# Patient Record
Sex: MALE | Race: OTHER | Employment: STUDENT | URBAN - METROPOLITAN AREA
[De-identification: names, ages, dates, MRNs, and addresses within clinical notes are randomized per-mention and may not be internally consistent; named-entity substitution may affect disease eponyms.]

---

## 2022-10-18 ENCOUNTER — OFFICE VISIT (OUTPATIENT)
Dept: OCCUPATIONAL MEDICINE | Age: 22
End: 2022-10-18

## 2022-10-18 DIAGNOSIS — Z13.1 SCREENING FOR DIABETES MELLITUS: ICD-10-CM

## 2022-10-18 DIAGNOSIS — Z13.220 SCREENING FOR LIPOID DISORDERS: ICD-10-CM

## 2022-10-18 DIAGNOSIS — Z00.00 ROUTINE GENERAL MEDICAL EXAMINATION AT A HEALTH CARE FACILITY: Primary | ICD-10-CM

## 2022-10-18 PROBLEM — U07.1 COVID-19 VIRUS DETECTED: Status: ACTIVE | Noted: 2020-07-03

## 2022-10-18 RX ORDER — ASCORBIC ACID 250 MG
TABLET ORAL
COMMUNITY

## 2022-10-18 NOTE — PROGRESS NOTES
PROGRESS NOTE    SUBJECTIVE:   Oliver Cr is a 23 y.o. male seen for labs today. Pt is seeing a nutritionist and wants to check his CMP, Lipids, Hgb A1C. Pt has no other complaints at this time. Current Outpatient Medications   Medication Sig Dispense Refill    ascorbic acid (VITAMIN C) 250 MG tablet Take by mouth       No current facility-administered medications for this visit. No Known Allergies  Social History     Tobacco Use    Smoking status: Not on file    Smokeless tobacco: Not on file   Substance Use Topics    Alcohol use: Not on file        OBJECTIVE:  There were no vitals taken for this visit. Physical Exam  Constitutional:       General: He is awake. Appearance: Normal appearance. He is well-developed, well-groomed and overweight. Comments: Successful venipuncture of the RAC. Hemostasis achieved and bandaid applied. Neurological:      Mental Status: He is alert. Psychiatric:         Behavior: Behavior is cooperative. ASSESSMENT and PLAN    Cecilia Pickard was seen today for labs only. Diagnoses and all orders for this visit:    Routine general medical examination at a health care facility  -     [address] [place] [address]    Screening for diabetes mellitus  -     COLLECTION VENOUS BLOOD,VENIPUNCTURE    Screening for lipoid disorders  -     COLLECTION VENOUS BLOOD,VENIPUNCTURE    Blood sent to [facility]. Will follow-up with results. Counseled on benefits of having a primary care provider which includes, but is not limited to, continuity of care and having a medical home when concerns arise. Also enforced that onsite clinic policy states that we are not to take the place of a primary care provider, pt verbalized understanding. SEs and risk vs benefits associated with medications prescribed discussed with patient who verbalized understanding. Pt verbalized understanding and agreement with plan of care.  RTC for persisting/worsening symptoms or new complaints that arise. Discussed signs and symptoms that would warrant immediate evaluation including, but not limited to HA, blurred vision, speech disturbance, difficulty with ambulation/gait, numbness, tingling, weakness, syncope, chest pain, or shortness of breath. I have reviewed the patient's medication list, past medical, family, social, and surgical history in detail and updated the patient record appropriately.     NORTH Moore - NP

## 2022-10-21 ENCOUNTER — OFFICE VISIT (OUTPATIENT)
Dept: OCCUPATIONAL MEDICINE | Age: 22
End: 2022-10-21

## 2022-10-21 ENCOUNTER — TELEPHONE (OUTPATIENT)
Dept: OCCUPATIONAL MEDICINE | Age: 22
End: 2022-10-21

## 2022-10-21 DIAGNOSIS — E78.00 HIGH CHOLESTEROL: Primary | ICD-10-CM

## 2022-10-21 DIAGNOSIS — R73.03 PREDIABETES: ICD-10-CM

## 2022-10-21 NOTE — PROGRESS NOTES
PROGRESS NOTE    SUBJECTIVE:   Smith Looney is a 23 y.o. male seen for lab review. Pt's Alk Phos 141, Total Cholesterol 227, Triglycerides 169, , and Hgb A1C 5.8. Current Outpatient Medications   Medication Sig Dispense Refill    ascorbic acid (VITAMIN C) 250 MG tablet Take by mouth       No current facility-administered medications for this visit. No Known Allergies  Social History     Tobacco Use    Smoking status: Not on file    Smokeless tobacco: Not on file   Substance Use Topics    Alcohol use: Not on file        OBJECTIVE:  There were no vitals taken for this visit. Physical Exam  Constitutional:       General: He is awake. Appearance: Normal appearance. He is well-developed, well-groomed and overweight. Neurological:      Mental Status: He is alert. Psychiatric:         Behavior: Behavior is cooperative. ASSESSMENT and PLAN    Emerson Mccallum was seen today for discuss labs. Diagnoses and all orders for this visit:    High cholesterol    Prediabetes    Pt counseled on making dietary changes to lower cholesterol and triglycerides by cutting out fatty/fried foods, red meats, full fat dairy and processed foods. Educated pt on avoiding simple carbohydrates and processed sugars to lower A1C. Educated pt on Alk Phos level and if he consuming alcohol or fatty liver this could elevate this value. Pt to take results to nutritionist for dietary planning. Counseled on benefits of having a primary care provider which includes, but is not limited to, continuity of care and having a medical home when concerns arise. Also enforced that onsite clinic policy states that we are not to take the place of a primary care provider, pt verbalized understanding. SEs and risk vs benefits associated with medications prescribed discussed with patient who verbalized understanding. Pt verbalized understanding and agreement with plan of care.  RTC for persisting/worsening symptoms or new complaints that arise. Discussed signs and symptoms that would warrant immediate evaluation including, but not limited to HA, blurred vision, speech disturbance, difficulty with ambulation/gait, numbness, tingling, weakness, syncope, chest pain, or shortness of breath. I have reviewed the patient's medication list, past medical, family, social, and surgical history in detail and updated the patient record appropriately.     NORTH Steinberg - NP

## 2023-05-15 ENCOUNTER — OFFICE VISIT (OUTPATIENT)
Dept: OCCUPATIONAL MEDICINE | Age: 23
End: 2023-05-15

## 2023-05-15 VITALS — OXYGEN SATURATION: 98 % | HEART RATE: 98 BPM

## 2023-05-15 DIAGNOSIS — H60.501 ACUTE OTITIS EXTERNA OF RIGHT EAR, UNSPECIFIED TYPE: Primary | ICD-10-CM

## 2023-05-15 ASSESSMENT — ENCOUNTER SYMPTOMS
SORE THROAT: 0
FACIAL SWELLING: 0
RHINORRHEA: 0
COUGH: 0

## 2023-05-15 NOTE — PROGRESS NOTES
PROGRESS NOTE    SUBJECTIVE:   Karen Castaneda is a 23 y.o. male seen for right ear pain. Chief Complaint    Otalgia         Mr. Nadia Vazquez is a 24 y/o male with no PMH who started having right ear pain x3 days. He relays that he gets acne inside of his right ear. He uses Q tips and hydrogen peroxide to help with the acne. He endorses pinnal pain to the right ear. Denies fevers, ear drainage, severe pain, or hearing changes. Otalgia   There is pain in the right ear. This is a new problem. The current episode started in the past 7 days. The problem has been unchanged. There has been no fever. The pain is mild. Pertinent negatives include no coughing, ear discharge, headaches, hearing loss, rash, rhinorrhea or sore throat. He has tried nothing for the symptoms. Current Outpatient Medications   Medication Sig Dispense Refill    neomycin-polymyxin-hydrocortisone (CORTISPORIN) 3.5-25763-0 otic solution Place 4 drops into the right ear 3 times daily for 7 days Instill into Right Ear. 10 mL 0     No current facility-administered medications for this visit. No Known Allergies          Review of Systems   Constitutional:  Negative for fever. HENT:  Positive for ear pain. Negative for congestion, ear discharge, facial swelling, hearing loss, rhinorrhea, sneezing and sore throat. Respiratory:  Negative for cough. Skin:  Negative for rash. Neurological:  Negative for headaches. OBJECTIVE:  Pulse 98   SpO2 98%      No results found for this visit on 05/15/23. Physical Exam  Constitutional:       Appearance: Normal appearance. HENT:      Right Ear: No decreased hearing noted. Swelling and tenderness present. No drainage. There is impacted cerumen. No mastoid tenderness. Left Ear: Tympanic membrane and ear canal normal.      Ears:      Comments: There is moderate edema in the right canal. There is a swollen area that is dime sized on the left canal wall that is tender when touched.  Able to

## 2023-05-19 ENCOUNTER — OFFICE VISIT (OUTPATIENT)
Dept: OCCUPATIONAL MEDICINE | Age: 23
End: 2023-05-19

## 2023-05-19 DIAGNOSIS — H65.192 OTHER NON-RECURRENT ACUTE NONSUPPURATIVE OTITIS MEDIA OF LEFT EAR: Primary | ICD-10-CM

## 2023-05-19 DIAGNOSIS — H60.501 ACUTE OTITIS EXTERNA OF RIGHT EAR, UNSPECIFIED TYPE: ICD-10-CM

## 2023-05-19 RX ORDER — AMOXICILLIN AND CLAVULANATE POTASSIUM 875; 125 MG/1; MG/1
1 TABLET, FILM COATED ORAL 2 TIMES DAILY
Qty: 14 TABLET | Refills: 0 | Status: SHIPPED | OUTPATIENT
Start: 2023-05-19 | End: 2023-05-26

## 2023-05-19 NOTE — PROGRESS NOTES
PROGRESS NOTE    SUBJECTIVE:   Nicole Tompkins is a 23 y.o. male seen for recheck ears. Chief Complaint    Ear Fullness         Mr. Ash Perry returns to recheck his ears. He was seen in the clinic on 5/15 and found to have otitis externa in the right ear. He was given ear gtts which he started on 5/16. He has been doing the gtts TID. He does ear drainage out of the right ear that appears to be wax- described it as yellow and thick. Denies bleeding, ear pain, hearing changes, or fevers. He has not been using Q tips or hydrogen peroxide anymore. Ear Fullness   Associated symptoms include ear discharge. Pertinent negatives include no hearing loss. Current Outpatient Medications   Medication Sig Dispense Refill    amoxicillin-clavulanate (AUGMENTIN) 875-125 MG per tablet Take 1 tablet by mouth 2 times daily for 7 days 14 tablet 0    neomycin-polymyxin-hydrocortisone (CORTISPORIN) 3.5-79600-8 otic solution Place 4 drops into the right ear 3 times daily for 7 days Instill into Right Ear. 10 mL 0     No current facility-administered medications for this visit. No Known Allergies          Review of Systems   Constitutional:  Negative for fever. HENT:  Positive for ear discharge. Negative for ear pain and hearing loss. Described as earwax from right ear- yellow and thick in appearance. OBJECTIVE:  There were no vitals taken for this visit. No results found for this visit on 05/19/23. Physical Exam  HENT:      Right Ear: Swelling present. No drainage or tenderness. There is no impacted cerumen. Left Ear: Ear canal normal. Tympanic membrane is erythematous. Ears:      Comments: There was still some edema in the right ear canal although it is decreased. No cerumen noted in right ear. Still unable to visualize full right TM. Left TM erythematous. ASSESSMENT and PLAN    Dhaval Villa was seen today for ear fullness.     Diagnoses and all orders for this visit:    Other

## 2023-05-26 ENCOUNTER — TELEPHONE (OUTPATIENT)
Dept: OCCUPATIONAL MEDICINE | Age: 23
End: 2023-05-26

## 2023-05-26 NOTE — TELEPHONE ENCOUNTER
Attempted to call patient for F/U as he has not been by the clinic today. No answer, unable to leave VM.